# Patient Record
Sex: MALE | Race: WHITE | NOT HISPANIC OR LATINO | Employment: FULL TIME | URBAN - METROPOLITAN AREA
[De-identification: names, ages, dates, MRNs, and addresses within clinical notes are randomized per-mention and may not be internally consistent; named-entity substitution may affect disease eponyms.]

---

## 2022-03-02 ENCOUNTER — APPOINTMENT (OUTPATIENT)
Dept: CARDIOLOGY | Facility: HOSPITAL | Age: 47
End: 2022-03-02

## 2022-03-02 ENCOUNTER — HOSPITAL ENCOUNTER (OUTPATIENT)
Facility: HOSPITAL | Age: 47
Setting detail: OBSERVATION
Discharge: HOME | End: 2022-03-03
Attending: EMERGENCY MEDICINE | Admitting: FAMILY MEDICINE

## 2022-03-02 ENCOUNTER — APPOINTMENT (OUTPATIENT)
Dept: GENERAL RADIOLOGY | Facility: HOSPITAL | Age: 47
End: 2022-03-02

## 2022-03-02 DIAGNOSIS — I20.0 UNSTABLE ANGINA: Primary | ICD-10-CM

## 2022-03-02 LAB
ALBUMIN SERPL-MCNC: 4.7 G/DL (ref 3.5–5.2)
ALBUMIN/GLOB SERPL: 1.8 G/DL
ALP SERPL-CCNC: 91 U/L (ref 39–117)
ALT SERPL W P-5'-P-CCNC: 34 U/L (ref 1–41)
ANION GAP SERPL CALCULATED.3IONS-SCNC: 10.9 MMOL/L (ref 5–15)
AST SERPL-CCNC: 20 U/L (ref 1–40)
BASOPHILS # BLD AUTO: 0.06 10*3/MM3 (ref 0–0.2)
BASOPHILS NFR BLD AUTO: 0.6 % (ref 0–1.5)
BILIRUB SERPL-MCNC: 0.4 MG/DL (ref 0–1.2)
BUN SERPL-MCNC: 16 MG/DL (ref 6–20)
BUN/CREAT SERPL: 14.8 (ref 7–25)
CALCIUM SPEC-SCNC: 9.9 MG/DL (ref 8.6–10.5)
CHLORIDE SERPL-SCNC: 101 MMOL/L (ref 98–107)
CK MB SERPL-CCNC: 1.86 NG/ML
CK SERPL-CCNC: 107 U/L (ref 20–200)
CO2 SERPL-SCNC: 25.1 MMOL/L (ref 22–29)
CREAT SERPL-MCNC: 1.08 MG/DL (ref 0.76–1.27)
DEPRECATED RDW RBC AUTO: 39 FL (ref 37–54)
EGFRCR SERPLBLD CKD-EPI 2021: 85.7 ML/MIN/1.73
EOSINOPHIL # BLD AUTO: 0.18 10*3/MM3 (ref 0–0.4)
EOSINOPHIL NFR BLD AUTO: 1.9 % (ref 0.3–6.2)
ERYTHROCYTE [DISTWIDTH] IN BLOOD BY AUTOMATED COUNT: 12.4 % (ref 12.3–15.4)
GLOBULIN UR ELPH-MCNC: 2.6 GM/DL
GLUCOSE SERPL-MCNC: 99 MG/DL (ref 65–99)
HBA1C MFR BLD: 5.8 % (ref 4.8–5.6)
HCT VFR BLD AUTO: 39.1 % (ref 37.5–51)
HGB BLD-MCNC: 14.5 G/DL (ref 13–17.7)
HOLD SPECIMEN: NORMAL
IMM GRANULOCYTES # BLD AUTO: 0.03 10*3/MM3 (ref 0–0.05)
IMM GRANULOCYTES NFR BLD AUTO: 0.3 % (ref 0–0.5)
LIPASE SERPL-CCNC: 17 U/L (ref 13–60)
LYMPHOCYTES # BLD AUTO: 1.92 10*3/MM3 (ref 0.7–3.1)
LYMPHOCYTES NFR BLD AUTO: 20.6 % (ref 19.6–45.3)
MAGNESIUM SERPL-MCNC: 2.1 MG/DL (ref 1.6–2.6)
MCH RBC QN AUTO: 32.2 PG (ref 26.6–33)
MCHC RBC AUTO-ENTMCNC: 37.1 G/DL (ref 31.5–35.7)
MCV RBC AUTO: 86.7 FL (ref 79–97)
MONOCYTES # BLD AUTO: 0.64 10*3/MM3 (ref 0.1–0.9)
MONOCYTES NFR BLD AUTO: 6.9 % (ref 5–12)
NEUTROPHILS NFR BLD AUTO: 6.49 10*3/MM3 (ref 1.7–7)
NEUTROPHILS NFR BLD AUTO: 69.7 % (ref 42.7–76)
NRBC BLD AUTO-RTO: 0 /100 WBC (ref 0–0.2)
NT-PROBNP SERPL-MCNC: <5 PG/ML (ref 0–450)
PLATELET # BLD AUTO: 290 10*3/MM3 (ref 140–450)
PMV BLD AUTO: 10.2 FL (ref 6–12)
POTASSIUM SERPL-SCNC: 3.1 MMOL/L (ref 3.5–5.2)
PROT SERPL-MCNC: 7.3 G/DL (ref 6–8.5)
RBC # BLD AUTO: 4.51 10*6/MM3 (ref 4.14–5.8)
RBC MORPH BLD: NORMAL
SMALL PLATELETS BLD QL SMEAR: ADEQUATE
SODIUM SERPL-SCNC: 137 MMOL/L (ref 136–145)
TROPONIN I SERPL-MCNC: 0 NG/ML (ref 0–0.6)
TROPONIN I SERPL-MCNC: 0 NG/ML (ref 0–0.6)
WBC MORPH BLD: NORMAL
WBC NRBC COR # BLD: 9.32 10*3/MM3 (ref 3.4–10.8)
WHOLE BLOOD HOLD SPECIMEN: NORMAL
WHOLE BLOOD HOLD SPECIMEN: NORMAL

## 2022-03-02 PROCEDURE — 85025 COMPLETE CBC W/AUTO DIFF WBC: CPT

## 2022-03-02 PROCEDURE — 83880 ASSAY OF NATRIURETIC PEPTIDE: CPT

## 2022-03-02 PROCEDURE — 82553 CREATINE MB FRACTION: CPT

## 2022-03-02 PROCEDURE — 82550 ASSAY OF CK (CPK): CPT

## 2022-03-02 PROCEDURE — 93005 ELECTROCARDIOGRAM TRACING: CPT | Performed by: INTERNAL MEDICINE

## 2022-03-02 PROCEDURE — 71045 X-RAY EXAM CHEST 1 VIEW: CPT

## 2022-03-02 PROCEDURE — 93005 ELECTROCARDIOGRAM TRACING: CPT | Performed by: EMERGENCY MEDICINE

## 2022-03-02 PROCEDURE — 93010 ELECTROCARDIOGRAM REPORT: CPT | Performed by: SPECIALIST

## 2022-03-02 PROCEDURE — G0378 HOSPITAL OBSERVATION PER HR: HCPCS

## 2022-03-02 PROCEDURE — 83690 ASSAY OF LIPASE: CPT

## 2022-03-02 PROCEDURE — 85007 BL SMEAR W/DIFF WBC COUNT: CPT

## 2022-03-02 PROCEDURE — 84484 ASSAY OF TROPONIN QUANT: CPT

## 2022-03-02 PROCEDURE — 99285 EMERGENCY DEPT VISIT HI MDM: CPT

## 2022-03-02 PROCEDURE — 83036 HEMOGLOBIN GLYCOSYLATED A1C: CPT | Performed by: INTERNAL MEDICINE

## 2022-03-02 PROCEDURE — 83735 ASSAY OF MAGNESIUM: CPT

## 2022-03-02 PROCEDURE — 99220 PR INITIAL OBSERVATION CARE/DAY 70 MINUTES: CPT | Performed by: INTERNAL MEDICINE

## 2022-03-02 PROCEDURE — 80053 COMPREHEN METABOLIC PANEL: CPT

## 2022-03-02 PROCEDURE — 94799 UNLISTED PULMONARY SVC/PX: CPT

## 2022-03-02 PROCEDURE — 93010 ELECTROCARDIOGRAM REPORT: CPT | Performed by: INTERNAL MEDICINE

## 2022-03-02 PROCEDURE — 93005 ELECTROCARDIOGRAM TRACING: CPT

## 2022-03-02 RX ORDER — BUPROPION HYDROCHLORIDE 150 MG/1
150 TABLET ORAL DAILY
Status: DISCONTINUED | OUTPATIENT
Start: 2022-03-03 | End: 2022-03-03 | Stop reason: HOSPADM

## 2022-03-02 RX ORDER — ONDANSETRON 2 MG/ML
4 INJECTION INTRAMUSCULAR; INTRAVENOUS EVERY 6 HOURS PRN
Status: DISCONTINUED | OUTPATIENT
Start: 2022-03-02 | End: 2022-03-03 | Stop reason: HOSPADM

## 2022-03-02 RX ORDER — ASPIRIN 81 MG/1
324 TABLET, CHEWABLE ORAL ONCE
Status: COMPLETED | OUTPATIENT
Start: 2022-03-02 | End: 2022-03-02

## 2022-03-02 RX ORDER — ALUMINA, MAGNESIA, AND SIMETHICONE 2400; 2400; 240 MG/30ML; MG/30ML; MG/30ML
15 SUSPENSION ORAL EVERY 6 HOURS PRN
Status: DISCONTINUED | OUTPATIENT
Start: 2022-03-02 | End: 2022-03-03 | Stop reason: HOSPADM

## 2022-03-02 RX ORDER — MIRTAZAPINE 15 MG/1
15 TABLET, FILM COATED ORAL NIGHTLY
COMMUNITY

## 2022-03-02 RX ORDER — ATORVASTATIN CALCIUM 40 MG/1
80 TABLET, FILM COATED ORAL NIGHTLY
Status: DISCONTINUED | OUTPATIENT
Start: 2022-03-02 | End: 2022-03-03 | Stop reason: HOSPADM

## 2022-03-02 RX ORDER — POLYETHYLENE GLYCOL 3350 17 G/17G
17 POWDER, FOR SOLUTION ORAL DAILY PRN
Status: DISCONTINUED | OUTPATIENT
Start: 2022-03-02 | End: 2022-03-03 | Stop reason: HOSPADM

## 2022-03-02 RX ORDER — ACETAMINOPHEN 325 MG/1
650 TABLET ORAL EVERY 4 HOURS PRN
Status: DISCONTINUED | OUTPATIENT
Start: 2022-03-02 | End: 2022-03-03 | Stop reason: HOSPADM

## 2022-03-02 RX ORDER — MULTIPLE VITAMINS W/ MINERALS TAB 9MG-400MCG
1 TAB ORAL DAILY
Status: DISCONTINUED | OUTPATIENT
Start: 2022-03-03 | End: 2022-03-03 | Stop reason: HOSPADM

## 2022-03-02 RX ORDER — NITROGLYCERIN 0.4 MG/1
0.4 TABLET SUBLINGUAL
Status: DISCONTINUED | OUTPATIENT
Start: 2022-03-02 | End: 2022-03-03 | Stop reason: HOSPADM

## 2022-03-02 RX ORDER — AMOXICILLIN 250 MG
2 CAPSULE ORAL 2 TIMES DAILY
Status: DISCONTINUED | OUTPATIENT
Start: 2022-03-02 | End: 2022-03-03 | Stop reason: HOSPADM

## 2022-03-02 RX ORDER — MORPHINE SULFATE 2 MG/ML
1 INJECTION, SOLUTION INTRAMUSCULAR; INTRAVENOUS EVERY 4 HOURS PRN
Status: DISCONTINUED | OUTPATIENT
Start: 2022-03-02 | End: 2022-03-03 | Stop reason: HOSPADM

## 2022-03-02 RX ORDER — SODIUM CHLORIDE 0.9 % (FLUSH) 0.9 %
10 SYRINGE (ML) INJECTION AS NEEDED
Status: DISCONTINUED | OUTPATIENT
Start: 2022-03-02 | End: 2022-03-03 | Stop reason: HOSPADM

## 2022-03-02 RX ORDER — MULTIPLE VITAMINS W/ MINERALS TAB 9MG-400MCG
1 TAB ORAL DAILY
COMMUNITY

## 2022-03-02 RX ORDER — BUPROPION HYDROCHLORIDE 150 MG/1
150 TABLET ORAL NIGHTLY
COMMUNITY

## 2022-03-02 RX ORDER — BISACODYL 5 MG/1
5 TABLET, DELAYED RELEASE ORAL DAILY PRN
Status: DISCONTINUED | OUTPATIENT
Start: 2022-03-02 | End: 2022-03-03 | Stop reason: HOSPADM

## 2022-03-02 RX ORDER — NALOXONE HCL 0.4 MG/ML
0.4 VIAL (ML) INJECTION
Status: DISCONTINUED | OUTPATIENT
Start: 2022-03-02 | End: 2022-03-03 | Stop reason: HOSPADM

## 2022-03-02 RX ORDER — POTASSIUM CHLORIDE 750 MG/1
40 CAPSULE, EXTENDED RELEASE ORAL EVERY 4 HOURS
Status: COMPLETED | OUTPATIENT
Start: 2022-03-02 | End: 2022-03-03

## 2022-03-02 RX ORDER — HYDROCHLOROTHIAZIDE 25 MG/1
25 TABLET ORAL NIGHTLY
COMMUNITY
End: 2022-03-03 | Stop reason: HOSPADM

## 2022-03-02 RX ORDER — MIRTAZAPINE 15 MG/1
15 TABLET, FILM COATED ORAL NIGHTLY
Status: DISCONTINUED | OUTPATIENT
Start: 2022-03-03 | End: 2022-03-03 | Stop reason: HOSPADM

## 2022-03-02 RX ORDER — ASPIRIN 81 MG/1
81 TABLET ORAL DAILY
Status: DISCONTINUED | OUTPATIENT
Start: 2022-03-03 | End: 2022-03-03 | Stop reason: HOSPADM

## 2022-03-02 RX ORDER — BISACODYL 10 MG
10 SUPPOSITORY, RECTAL RECTAL DAILY PRN
Status: DISCONTINUED | OUTPATIENT
Start: 2022-03-02 | End: 2022-03-03 | Stop reason: HOSPADM

## 2022-03-02 RX ADMIN — METOPROLOL TARTRATE 12.5 MG: 25 TABLET, FILM COATED ORAL at 21:00

## 2022-03-02 RX ADMIN — POTASSIUM CHLORIDE 40 MEQ: 750 CAPSULE, EXTENDED RELEASE ORAL at 20:20

## 2022-03-02 RX ADMIN — ASPIRIN 81 MG CHEWABLE TABLET 324 MG: 81 TABLET CHEWABLE at 14:24

## 2022-03-02 NOTE — ED PROVIDER NOTES
"Subjective   Patient presents to the emergency department reporting an episode of severe chest pain at work today.  He scribes the pain as a tightness and pressure.  He also states that it felt like a heavy weight was sitting on his chest.  He says that his face went flushing he felt like he was \"going down\".  He reports that he quit smoking 2 weeks ago.  He says for the past year he has been having headaches off and on and now it is in his chest.  He does report a history of anxiety but says this is different from anything he is ever experienced.        History provided by:  Patient   used: No        Review of Systems   Constitutional: Negative for chills and fever.   HENT: Negative for congestion, ear pain, rhinorrhea and sore throat.    Eyes: Negative for pain.   Respiratory: Negative for cough and shortness of breath.    Cardiovascular: Positive for chest pain (Weight, pressure, tightness).   Gastrointestinal: Negative for abdominal pain, diarrhea, nausea and vomiting.   Genitourinary: Negative for decreased urine volume, dysuria and flank pain.   Musculoskeletal: Negative for arthralgias and myalgias.   Skin: Positive for color change (Face flush). Negative for rash.   Neurological: Positive for light-headedness. Negative for seizures and headaches.   All other systems reviewed and are negative.      History reviewed. No pertinent past medical history.    No Known Allergies    History reviewed. No pertinent surgical history.    History reviewed. No pertinent family history.    Social History     Socioeconomic History   • Marital status:    Tobacco Use   • Smoking status: Former Smoker     Quit date: 2022     Years since quittin.0           Objective   Physical Exam  Vitals and nursing note reviewed.   Constitutional:       General: He is not in acute distress.     Appearance: Normal appearance. He is normal weight. He is not ill-appearing, toxic-appearing or diaphoretic. "   HENT:      Head: Normocephalic and atraumatic.      Right Ear: External ear normal.      Left Ear: External ear normal.   Eyes:      General: No scleral icterus.     Conjunctiva/sclera: Conjunctivae normal.      Pupils: Pupils are equal, round, and reactive to light.   Cardiovascular:      Rate and Rhythm: Normal rate and regular rhythm.      Heart sounds: Normal heart sounds.   Pulmonary:      Effort: Pulmonary effort is normal. No respiratory distress.      Breath sounds: Normal breath sounds.   Abdominal:      General: Abdomen is flat. Bowel sounds are normal. There is no distension.      Palpations: Abdomen is soft.      Tenderness: There is no abdominal tenderness.   Musculoskeletal:         General: No swelling, tenderness, deformity or signs of injury. Normal range of motion.      Cervical back: Normal range of motion and neck supple.   Skin:     General: Skin is warm and dry.      Capillary Refill: Capillary refill takes less than 2 seconds.   Neurological:      General: No focal deficit present.      Mental Status: He is alert and oriented to person, place, and time.   Psychiatric:         Mood and Affect: Mood normal.         Behavior: Behavior normal.         Procedures           ED Course                                               HEART Score (for prediction of 6-week risk of major adverse cardiac event) reviewed and/or performed as part of the patient evaluation and treatment planning process.  The result associated with this review/performance is: 4       MDM  Number of Diagnoses or Management Options  Unstable angina (HCC): new and requires workup     Amount and/or Complexity of Data Reviewed  Clinical lab tests: reviewed  Tests in the radiology section of CPT®: reviewed  Tests in the medicine section of CPT®: reviewed  Discuss the patient with other providers: yes (Dr. Topher Stone  )    Risk of Complications, Morbidity, and/or Mortality  Presenting problems:  moderate  Diagnostic procedures: moderate  Management options: moderate    Patient Progress  Patient progress: stable      Final diagnoses:   Unstable angina (HCC)       ED Disposition  ED Disposition     ED Disposition   Decision to Admit    Condition   --    Comment   Level of Care: Telemetry [5]   Diagnosis: Unstable angina (HCC) [040162]   Admitting Physician: AMPARO SAENZ [[id number]]   Attending Physician: AMPARO SAENZ [[id number]]               Reinier Figueroa, APRN  [address] [facility] [address]  [place] [address]  [phone]      Hospital discharge follow-up transitional care 1 to 2 weeks Chest pain myocardial infarction ruled out, prediabetes, hyperlipidemia, hypertension hypokalemia    Reinier Figueroa, APRN  [address] [facility] [address]  [place] [id number] [phone]               Medication List      New Prescriptions    aspirin 81 MG EC tablet  Take 1 tablet by mouth Daily for 30 days.     atorvastatin 20 MG tablet  Commonly known as: LIPITOR  Take 1 tablet by mouth Every Night for 30 days.     losartan 25 MG tablet  Commonly known as: Cozaar  Take 1 tablet by mouth Daily for 30 days.        Stop    hydroCHLOROthiazide 25 MG tablet  Commonly known as: HYDRODIURIL           Where to Get Your Medications      These medications were sent to [facility] - [place], [place] - [address] - [phone]  - [phone]   [address], [place] [address]    Phone: [phone]   · aspirin 81 MG EC tablet  · atorvastatin 20 MG tablet  · losartan 25 MG tablet          Deborah Phelan, ANNY  03/05/22 6148

## 2022-03-03 ENCOUNTER — APPOINTMENT (OUTPATIENT)
Dept: NUCLEAR MEDICINE | Facility: HOSPITAL | Age: 47
End: 2022-03-03

## 2022-03-03 ENCOUNTER — APPOINTMENT (OUTPATIENT)
Dept: CARDIOLOGY | Facility: HOSPITAL | Age: 47
End: 2022-03-03

## 2022-03-03 ENCOUNTER — TELEPHONE (OUTPATIENT)
Dept: CARDIOLOGY | Facility: CLINIC | Age: 47
End: 2022-03-03

## 2022-03-03 VITALS
BODY MASS INDEX: 31.14 KG/M2 | HEIGHT: 67 IN | DIASTOLIC BLOOD PRESSURE: 76 MMHG | OXYGEN SATURATION: 97 % | TEMPERATURE: 97.7 F | WEIGHT: 198.41 LBS | HEART RATE: 61 BPM | SYSTOLIC BLOOD PRESSURE: 115 MMHG | RESPIRATION RATE: 20 BRPM

## 2022-03-03 PROBLEM — E78.5 HYPERLIPIDEMIA: Status: ACTIVE | Noted: 2022-03-03

## 2022-03-03 PROBLEM — I20.0 UNSTABLE ANGINA: Status: RESOLVED | Noted: 2022-03-02 | Resolved: 2022-03-03

## 2022-03-03 PROBLEM — R07.9 CHEST PAIN: Status: ACTIVE | Noted: 2022-03-03

## 2022-03-03 PROBLEM — Z72.0 TOBACCO ABUSE: Status: ACTIVE | Noted: 2022-03-03

## 2022-03-03 PROBLEM — R73.03 PRE-DIABETES: Status: ACTIVE | Noted: 2022-03-03

## 2022-03-03 LAB
ANION GAP SERPL CALCULATED.3IONS-SCNC: 9.4 MMOL/L (ref 5–15)
BH CV ECHO MEAS - AO ROOT DIAM: 3.2 CM
BH CV ECHO MEAS - EDV(MOD-SP2): 75 ML
BH CV ECHO MEAS - EDV(MOD-SP4): 97 ML
BH CV ECHO MEAS - EF(MOD-BP): 47 %
BH CV ECHO MEAS - ESV(MOD-SP2): 41 ML
BH CV ECHO MEAS - ESV(MOD-SP4): 51 ML
BH CV ECHO MEAS - IVSD: 1.1 CM
BH CV ECHO MEAS - LA DIMENSION(2D): 3.2 CM
BH CV ECHO MEAS - LAT PEAK E' VEL: 10.7 CM/SEC
BH CV ECHO MEAS - LVIDD: 4.7 CM
BH CV ECHO MEAS - LVIDS: 2.8 CM
BH CV ECHO MEAS - LVOT DIAM: 2 CM
BH CV ECHO MEAS - LVPWD: 0.9 CM
BH CV ECHO MEAS - MED PEAK E' VEL: 7.83 CM/SEC
BH CV ECHO MEAS - MV A MAX VEL: 58 CM/SEC
BH CV ECHO MEAS - MV DEC TIME: 170 MSEC
BH CV ECHO MEAS - MV E MAX VEL: 94 CM/SEC
BH CV ECHO MEAS - MV E/A: 1.6
BH CV ECHO MEAS - MV MAX PG: 4 MMHG
BH CV ECHO MEAS - MV MEAN PG: 1 MMHG
BH CV ECHO MEAS - MV P1/2T: 72 MSEC
BH CV ECHO MEAS - MV V2 VTI: 32 CM
BH CV ECHO MEAS - MVA(P1/2T): 3.1 CM2
BH CV ECHO MEAS - RVDD: 2.7 CM
BH CV ECHO MEASUREMENTS AVERAGE E/E' RATIO: 10.15
BH CV IMMEDIATE POST RECOVERY TECH DATA SYMPTOMS: NORMAL
BH CV IMMEDIATE POST TECH DATA BLOOD PRESSURE: NORMAL MMHG
BH CV IMMEDIATE POST TECH DATA HEART RATE: 150 BPM
BH CV IMMEDIATE POST TECH DATA OXYGEN SATS: 94 %
BH CV REST NUCLEAR ISOTOPE DOSE: 9.7 MCI
BH CV SIX MINUTE RECOVERY TECH DATA BLOOD PRESSURE: NORMAL
BH CV SIX MINUTE RECOVERY TECH DATA HEART RATE: 98 BPM
BH CV SIX MINUTE RECOVERY TECH DATA OXYGEN SATURATION: 96 %
BH CV STRESS BP STAGE 1: NORMAL
BH CV STRESS BP STAGE 2: NORMAL
BH CV STRESS BP STAGE 3: NORMAL
BH CV STRESS BP STAGE 4: NORMAL
BH CV STRESS DURATION MIN STAGE 1: 3
BH CV STRESS DURATION MIN STAGE 2: 3
BH CV STRESS DURATION MIN STAGE 3: 3
BH CV STRESS DURATION SEC STAGE 1: 0
BH CV STRESS DURATION SEC STAGE 2: 0
BH CV STRESS DURATION SEC STAGE 3: 0
BH CV STRESS DURATION SEC STAGE 4: 30
BH CV STRESS GRADE STAGE 1: 10
BH CV STRESS GRADE STAGE 2: 12
BH CV STRESS GRADE STAGE 3: 14
BH CV STRESS GRADE STAGE 4: 16
BH CV STRESS HR STAGE 1: 100
BH CV STRESS HR STAGE 2: 120
BH CV STRESS HR STAGE 3: 145
BH CV STRESS HR STAGE 4: 150
BH CV STRESS METS STAGE 1: 5
BH CV STRESS METS STAGE 2: 7.5
BH CV STRESS METS STAGE 3: 10
BH CV STRESS METS STAGE 4: 13.5
BH CV STRESS NUCLEAR ISOTOPE DOSE: 35.6 MCI
BH CV STRESS O2 STAGE 1: 95
BH CV STRESS O2 STAGE 2: 96
BH CV STRESS O2 STAGE 3: 94
BH CV STRESS O2 STAGE 4: 94
BH CV STRESS PROTOCOL 1: NORMAL
BH CV STRESS RECOVERY BP: NORMAL MMHG
BH CV STRESS RECOVERY HR: 98 BPM
BH CV STRESS RECOVERY O2: 97 %
BH CV STRESS SPEED STAGE 1: 1.7
BH CV STRESS SPEED STAGE 2: 2.5
BH CV STRESS SPEED STAGE 3: 3.4
BH CV STRESS SPEED STAGE 4: 4.2
BH CV STRESS STAGE 1: 1
BH CV STRESS STAGE 2: 2
BH CV STRESS STAGE 3: 3
BH CV STRESS STAGE 4: 4
BH CV THREE MINUTE POST TECH DATA BLOOD PRESSURE: NORMAL MMHG
BH CV THREE MINUTE POST TECH DATA HEART RATE: 105 BPM
BH CV THREE MINUTE POST TECH DATA OXYGEN SATURATION: 97 %
BH CV THREE MINUTE RECOVERY TECH DATA SYMPTOM: NORMAL
BUN SERPL-MCNC: 18 MG/DL (ref 6–20)
BUN/CREAT SERPL: 17.3 (ref 7–25)
CALCIUM SPEC-SCNC: 9.6 MG/DL (ref 8.6–10.5)
CHLORIDE SERPL-SCNC: 106 MMOL/L (ref 98–107)
CHOLEST SERPL-MCNC: 211 MG/DL (ref 0–200)
CO2 SERPL-SCNC: 26.6 MMOL/L (ref 22–29)
CREAT SERPL-MCNC: 1.04 MG/DL (ref 0.76–1.27)
DEPRECATED RDW RBC AUTO: 40.6 FL (ref 37–54)
EGFRCR SERPLBLD CKD-EPI 2021: 89.7 ML/MIN/1.73
ERYTHROCYTE [DISTWIDTH] IN BLOOD BY AUTOMATED COUNT: 12.6 % (ref 12.3–15.4)
GLUCOSE SERPL-MCNC: 112 MG/DL (ref 65–99)
HCT VFR BLD AUTO: 39.8 % (ref 37.5–51)
HDLC SERPL-MCNC: 33 MG/DL (ref 40–60)
HGB BLD-MCNC: 14.3 G/DL (ref 13–17.7)
INR PPP: 1.05 (ref 2–3)
IVRT: 98 MSEC
LDLC SERPL CALC-MCNC: 110 MG/DL (ref 0–100)
LDLC/HDLC SERPL: 3.02 {RATIO}
LEFT ATRIUM VOLUME INDEX: 23.4 ML/M2
LV EF NUC BP: 60 %
MAGNESIUM SERPL-MCNC: 2.4 MG/DL (ref 1.6–2.6)
MAXIMAL PREDICTED HEART RATE: 174 BPM
MAXIMAL PREDICTED HEART RATE: 174 BPM
MCH RBC QN AUTO: 31.5 PG (ref 26.6–33)
MCHC RBC AUTO-ENTMCNC: 35.9 G/DL (ref 31.5–35.7)
MCV RBC AUTO: 87.7 FL (ref 79–97)
PERCENT MAX PREDICTED HR: 86.21 %
PHOSPHATE SERPL-MCNC: 2.6 MG/DL (ref 2.5–4.5)
PLATELET # BLD AUTO: 282 10*3/MM3 (ref 140–450)
PMV BLD AUTO: 10 FL (ref 6–12)
POTASSIUM SERPL-SCNC: 3.9 MMOL/L (ref 3.5–5.2)
PROTHROMBIN TIME: 10.9 SECONDS (ref 9.4–12)
QT INTERVAL: 375 MS
QT INTERVAL: 396 MS
QT INTERVAL: 399 MS
RBC # BLD AUTO: 4.54 10*6/MM3 (ref 4.14–5.8)
SODIUM SERPL-SCNC: 142 MMOL/L (ref 136–145)
STRESS BASELINE BP: NORMAL MMHG
STRESS BASELINE HR: 79 BPM
STRESS O2 SAT REST: 97 %
STRESS PERCENT HR: 101 %
STRESS POST ESTIMATED WORKLOAD: 10.7 METS
STRESS POST EXERCISE DUR MIN: 9 MIN
STRESS POST EXERCISE DUR SEC: 35 SEC
STRESS POST O2 SAT PEAK: 98 %
STRESS POST PEAK BP: NORMAL MMHG
STRESS POST PEAK HR: 150 BPM
STRESS TARGET HR: 148 BPM
STRESS TARGET HR: 148 BPM
TRIGL SERPL-MCNC: 391 MG/DL (ref 0–150)
TROPONIN T SERPL-MCNC: <0.01 NG/ML (ref 0–0.03)
VLDLC SERPL-MCNC: 68 MG/DL (ref 5–40)
WBC NRBC COR # BLD: 8.97 10*3/MM3 (ref 3.4–10.8)

## 2022-03-03 PROCEDURE — 96372 THER/PROPH/DIAG INJ SC/IM: CPT

## 2022-03-03 PROCEDURE — 93016 CV STRESS TEST SUPVJ ONLY: CPT | Performed by: NURSE PRACTITIONER

## 2022-03-03 PROCEDURE — 83735 ASSAY OF MAGNESIUM: CPT | Performed by: INTERNAL MEDICINE

## 2022-03-03 PROCEDURE — 85610 PROTHROMBIN TIME: CPT | Performed by: INTERNAL MEDICINE

## 2022-03-03 PROCEDURE — 85027 COMPLETE CBC AUTOMATED: CPT | Performed by: INTERNAL MEDICINE

## 2022-03-03 PROCEDURE — 93306 TTE W/DOPPLER COMPLETE: CPT | Performed by: INTERNAL MEDICINE

## 2022-03-03 PROCEDURE — A9502 TC99M TETROFOSMIN: HCPCS | Performed by: FAMILY MEDICINE

## 2022-03-03 PROCEDURE — 25010000002 SULFUR HEXAFLUORIDE MICROSPH 60.7-25 MG RECONSTITUTED SUSPENSION: Performed by: FAMILY MEDICINE

## 2022-03-03 PROCEDURE — 93017 CV STRESS TEST TRACING ONLY: CPT

## 2022-03-03 PROCEDURE — G0378 HOSPITAL OBSERVATION PER HR: HCPCS

## 2022-03-03 PROCEDURE — 84484 ASSAY OF TROPONIN QUANT: CPT | Performed by: INTERNAL MEDICINE

## 2022-03-03 PROCEDURE — 25010000002 ENOXAPARIN PER 10 MG: Performed by: INTERNAL MEDICINE

## 2022-03-03 PROCEDURE — 99203 OFFICE O/P NEW LOW 30 MIN: CPT | Performed by: SPECIALIST

## 2022-03-03 PROCEDURE — 93306 TTE W/DOPPLER COMPLETE: CPT

## 2022-03-03 PROCEDURE — 78452 HT MUSCLE IMAGE SPECT MULT: CPT

## 2022-03-03 PROCEDURE — 80048 BASIC METABOLIC PNL TOTAL CA: CPT | Performed by: INTERNAL MEDICINE

## 2022-03-03 PROCEDURE — 78452 HT MUSCLE IMAGE SPECT MULT: CPT | Performed by: SPECIALIST

## 2022-03-03 PROCEDURE — 84100 ASSAY OF PHOSPHORUS: CPT | Performed by: INTERNAL MEDICINE

## 2022-03-03 PROCEDURE — 93018 CV STRESS TEST I&R ONLY: CPT | Performed by: SPECIALIST

## 2022-03-03 PROCEDURE — 94799 UNLISTED PULMONARY SVC/PX: CPT

## 2022-03-03 PROCEDURE — 0 TECHNETIUM TETROFOSMIN KIT: Performed by: FAMILY MEDICINE

## 2022-03-03 PROCEDURE — 36415 COLL VENOUS BLD VENIPUNCTURE: CPT | Performed by: INTERNAL MEDICINE

## 2022-03-03 PROCEDURE — 99217 PR OBSERVATION CARE DISCHARGE MANAGEMENT: CPT | Performed by: FAMILY MEDICINE

## 2022-03-03 PROCEDURE — 80061 LIPID PANEL: CPT | Performed by: INTERNAL MEDICINE

## 2022-03-03 RX ORDER — NAPROXEN 500 MG/1
500 TABLET ORAL 2 TIMES DAILY WITH MEALS
COMMUNITY

## 2022-03-03 RX ORDER — CHOLECALCIFEROL (VITAMIN D3) 125 MCG
5 CAPSULE ORAL NIGHTLY PRN
COMMUNITY

## 2022-03-03 RX ORDER — LOSARTAN POTASSIUM 25 MG/1
25 TABLET ORAL DAILY
Qty: 30 TABLET | Refills: 0 | Status: SHIPPED | OUTPATIENT
Start: 2022-03-03 | End: 2022-04-02

## 2022-03-03 RX ORDER — ATORVASTATIN CALCIUM 20 MG/1
20 TABLET, FILM COATED ORAL NIGHTLY
Qty: 30 TABLET | Refills: 0 | Status: SHIPPED | OUTPATIENT
Start: 2022-03-03 | End: 2022-04-02

## 2022-03-03 RX ORDER — ASPIRIN 81 MG/1
81 TABLET ORAL DAILY
Qty: 30 TABLET | Refills: 0 | Status: SHIPPED | OUTPATIENT
Start: 2022-03-03 | End: 2022-04-02

## 2022-03-03 RX ORDER — MECLIZINE HCL 25MG 25 MG/1
25 TABLET, CHEWABLE ORAL EVERY 6 HOURS PRN
COMMUNITY

## 2022-03-03 RX ADMIN — ENOXAPARIN SODIUM 40 MG: 100 INJECTION SUBCUTANEOUS at 00:03

## 2022-03-03 RX ADMIN — SENNOSIDES AND DOCUSATE SODIUM 2 TABLET: 50; 8.6 TABLET ORAL at 08:44

## 2022-03-03 RX ADMIN — MULTIPLE VITAMINS W/ MINERALS TAB 1 TABLET: TAB at 08:45

## 2022-03-03 RX ADMIN — Medication 10 ML: at 08:45

## 2022-03-03 RX ADMIN — POTASSIUM CHLORIDE 40 MEQ: 750 CAPSULE, EXTENDED RELEASE ORAL at 00:02

## 2022-03-03 RX ADMIN — SULFUR HEXAFLUORIDE 2 ML: KIT at 08:53

## 2022-03-03 RX ADMIN — TETROFOSMIN 1 DOSE: 1.38 INJECTION, POWDER, LYOPHILIZED, FOR SOLUTION INTRAVENOUS at 09:45

## 2022-03-03 RX ADMIN — TETROFOSMIN 1 DOSE: 1.38 INJECTION, POWDER, LYOPHILIZED, FOR SOLUTION INTRAVENOUS at 11:10

## 2022-03-03 RX ADMIN — ASPIRIN 81 MG: 81 TABLET, COATED ORAL at 08:45

## 2022-03-03 RX ADMIN — ATORVASTATIN CALCIUM 80 MG: 40 TABLET, FILM COATED ORAL at 00:02

## 2022-03-03 RX ADMIN — METOPROLOL TARTRATE 12.5 MG: 25 TABLET, FILM COATED ORAL at 12:15

## 2022-03-03 RX ADMIN — BUPROPION HYDROCHLORIDE 150 MG: 150 TABLET, EXTENDED RELEASE ORAL at 08:44

## 2022-03-03 RX ADMIN — MIRTAZAPINE 15 MG: 15 TABLET, FILM COATED ORAL at 00:02

## 2022-03-03 NOTE — TELEPHONE ENCOUNTER
Discharge nurse from [facility] called to make follow up. Patient's insurance requires an authorization to been seen in office , D/C nurse to inform pt needs to obtain authorization before appointment is made

## 2022-03-03 NOTE — SIGNIFICANT NOTE
03/03/22 1550   Plan   Final Discharge Disposition Code 01 - home or self-care   Final Note Pt discharging home with self care. Pt denies any dc needs. Dc medications sent to pharmacy. Verified demographics and pharmacy.

## 2022-03-03 NOTE — DISCHARGE SUMMARY
[facility]         HOSPITALIST  DISCHARGE SUMMARY    Patient Name: Ramses Mcclelland  : 1975  MRN: [id number]    Date of Admission: 3/2/2022  Date of Discharge: 3/3/2022  Primary Care Physician: Reinier Figueroa APRN    Consults     Date and Time Order Name Status Description    3/2/2022  7:12 PM Cardiology (on-call MD unless specified)      3/2/2022  6:45 PM Hospitalist (on-call MD unless specified)            Active and Resolved Hospital Problems:  Active Hospital Problems    Diagnosis POA   • Chest pain [R07.9] Yes   • Pre-diabetes [R73.03] Yes   • Hyperlipidemia [E78.5] Yes   • Tobacco abuse [Z72.0] No      Resolved Hospital Problems    Diagnosis POA   • Unstable angina (HCC) [I20.0] Yes       Hospital Course     Hospital Course:  Ramses Mcclelland is a 46 y.o. male who presents with intermittent chest pain over the past 2 weeks.  Patient has a pmhx notable for PPD smoking and HTN.  Patient states he has had episodes of central chest pain that are unprovoked, last about 2-3 minutes, resolve without any particular intervention and are described as a tightness.  They dont seem worsened by activity and can happen at rest.  He notes they were fairly mild initially and happened once or twice a day intermittently. Symptoms have gradually worsened over that time.  He has not tried any new medications for this.  Denies gerd symptoms.  Today at work he had an episode that was sever, central chest heaviness/weight/pressure like, he became flushed, had arm and hand numbness and tingling.  He states the episode made him the most terrified he has ever been and this prompted him to seek ED evaluation.  He was given 2x NG by EMS and notes this was the first thing that started to relieve his symptoms.  Currently he has only mild residual symptoms.    Of note patient states he quit smoking about 2 weeks ago when he started having chest pain.  Patient mated for further evaluation and treatment.   Cardiology consulted.  Serial cardiac enzymes within normal limits.  No ischemic changes on EKG.  No events on telemetry.  Patient went for chemical stress test which was within normal limits indicative of a low risk test.  Discussed with cardiology.  Patient continued to have some tenderness to palpation under the left breast no further issues with numbness and tingling or flushing.  Hemoglobin A1c found to be mildly elevated at 5.8.  Cholesterol triglycerides and LDL found to be elevated.  Discussed findings with patient and wife who are wishing to decrease patient's risk for cardiac events.  Encourage patient to continue not to smoke patient started on aspirin atorvastatin and losartan.  Hydrochlorothiazide stopped secondary to hypokalemia.  Serum potassium replaced.  Patient seen and evaluated on day of discharge and thought stable for discharge home with the following instructions:    Chest pain acute myocardial infarction ruled out  -Follow-up with Dr. Mayberry cardiology in 2 to 3 weeks  -Follow-up your primary care provider in 1 to 2 weeks  -Start aspirin 81 mg daily, atorvastatin 20 mg daily and losartan 25 mg daily for primary prevention of cardiac event in the next 10 years  -Continue not to smoke  -Start heart healthy carb consistent diet with exercise 30 minutes a day 5 days a week for goal weight loss of 1 to 2 pounds per week goal BMI of less than 26    Hypokalemia  -Stop home hydrochlorothiazide  -Start losartan as above    Hypertension  -Start losartan as above  -Stop home hydrochlorothiazide secondary to hypokalemia    Anxiety/depression  -Continue home mirtazapine and bupropion as prescribed by your primary care provider    Prediabetes A1c of 5.8  -Follow-up with primary care provider as above  -Start heart healthy carb consistent diet with 30 minutes of exercise 5 days a week goal BMI of less than 26        Please follow-up with providers as stated above.  Please take all medications per  discharge medication list.  Please seek immediate medical attention for increased chest pain, shortness of breath, nausea, vomiting, abdominal pain, change in color stools especially dark tarry stools.      DISCHARGE Follow Up Recommendations for labs and diagnostics: As above      Day of Discharge     Vital Signs:  Temp:  [97.3 °F (36.3 °C)-97.7 °F (36.5 °C)] 97.7 °F (36.5 °C)  Heart Rate:  [63-95] 70  Resp:  [17-27] 20  BP: (106-158)/() 123/77  Physical Exam:   Gen. well-developed appearing stated age in no acute distress  HEENT: Normocephalic atraumatic moist membranes pupils equal round reactive light, no scleral icterus no conjunctival injection  Cardiovascular: regular rate and rhythm no murmurs rubs or gallops S1-S2, no lower extremity edema appreciated  Pulmonary: Clear to auscultation bilaterally no wheezes rales or rhonchi symmetric chest expansion, unlabored, no conversational dyspnea appreciated  Gastrointestinal: Soft nontender nondistended positive bowel sounds all 4 quadrants no rebound or guarding  Musculoskeletal: No clubbing cyanosis, warm and well-perfused, calves soft symmetric nontender bilaterally  Skin: Clean dry without rashs  Neuro: Cranial nerves II through XII intact grossly no sensorimotor deficits appreciated bilateral upper and lower extremities  Psych: Patient is calm cooperative and appropriate with exam not responding to internal stimuli  : No [place] catheter no bladder distention no suprapubic tenderness      Discharge Details        Discharge Medications      New Medications      Instructions Start Date   aspirin 81 MG EC tablet   81 mg, Oral, Daily      atorvastatin 20 MG tablet  Commonly known as: LIPITOR   20 mg, Oral, Nightly      losartan 25 MG tablet  Commonly known as: Cozaar   25 mg, Oral, Daily         Continue These Medications      Instructions Start Date   buPROPion  MG 24 hr tablet  Commonly known as: WELLBUTRIN XL   150 mg, Oral, Nightly      meclizine 25  MG chewable tablet chewable tablet   25 mg, Oral, Every 6 Hours PRN      melatonin 5 MG tablet tablet   5 mg, Oral, Nightly PRN      mirtazapine 15 MG tablet  Commonly known as: REMERON   15 mg, Oral, Nightly      multivitamin with minerals tablet tablet   1 tablet, Oral, Daily      naproxen 500 MG tablet  Commonly known as: NAPROSYN   500 mg, Oral, 2 Times Daily With Meals         Stop These Medications    hydroCHLOROthiazide 25 MG tablet  Commonly known as: HYDRODIURIL            No Known Allergies    Discharge Disposition:  Home or Self Care    Diet:  Hospital:  Diet Order   Procedures   • Diet Regular; Cardiac       Discharge Activity:   Activity Instructions     Gradually Increase Activity Until at Pre-Hospitalization Level      Work Restrictions      Type of Restriction: Work    May Return to Work: Specific Date    Return To Work Date: 3/7/2022    With / Without Restrictions: Without Restrictions          CODE STATUS:  Code Status and Medical Interventions:   Ordered at: 03/02/22 1912     Code Status (Patient has no pulse and is not breathing):    CPR (Attempt to Resuscitate)     Medical Interventions (Patient has pulse or is breathing):    Full Support         No future appointments.    Additional Instructions for the Follow-ups that You Need to Schedule     Discharge Follow-up with PCP   As directed       Currently Documented PCP:    Reinier Figueroa APRN    PCP Phone Number:    [phone]     Follow Up Details: Hospital discharge follow-up transitional care 1 to 2 weeks Chest pain myocardial infarction ruled out, prediabetes, hyperlipidemia, hypertension hypokalemia         Discharge Follow-up with Specified Provider: Dr. Mayberry Cardiology; 2 Weeks   As directed      To: Dr. Mayberry Cardiology    Follow Up: 2 Weeks    Follow Up Details: Hospital discharge follow-up chest pain MI ruled out               Pertinent  and/or Most Recent Results     PROCEDURES:   None    LAB RESULTS:      Lab  03/03/22  0537 03/02/22  1414   WBC 8.97 9.32   HEMOGLOBIN 14.3 14.5   HEMATOCRIT 39.8 39.1   PLATELETS 282 290   NEUTROS ABS  --  6.49   IMMATURE GRANS (ABS)  --  0.03   LYMPHS ABS  --  1.92   MONOS ABS  --  0.64   EOS ABS  --  0.18   MCV 87.7 86.7   PROTIME 10.9  --          Lab 03/03/22  0537 03/02/22  1414   SODIUM 142 137   POTASSIUM 3.9 3.1*   CHLORIDE 106 101   CO2 26.6 25.1   ANION GAP 9.4 10.9   BUN 18 16   CREATININE 1.04 1.08   EGFR 89.7 85.7   GLUCOSE 112* 99   CALCIUM 9.6 9.9   MAGNESIUM 2.4 2.1   PHOSPHORUS 2.6  --    HEMOGLOBIN A1C  --  5.80*         Lab 03/02/22  1414   TOTAL PROTEIN 7.3   ALBUMIN 4.70   GLOBULIN 2.6   ALT (SGPT) 34   AST (SGOT) 20   BILIRUBIN 0.4   ALK PHOS 91   LIPASE 17         Lab 03/03/22  0537 03/02/22  1414   PROBNP  --  <5.0   TROPONIN T <0.010  --    PROTIME 10.9  --    INR 1.05*  --          Lab 03/03/22  0537   CHOLESTEROL 211*   LDL CHOL 110*   HDL CHOL 33*   TRIGLYCERIDES 391*             Brief Urine Lab Results     None        Microbiology Results (last 10 days)     ** No results found for the last 240 hours. **          XR Chest 1 View    Result Date: 3/2/2022  Impression:   No radiographic findings of acute cardiopulmonary abnormality.       BETI ARZATE MD       Electronically Signed and Approved By: BETI ARZATE MD on 3/02/2022 at 14:36                       Results for orders placed during the hospital encounter of 03/02/22    Adult Transthoracic Echo Complete W/ Cont if Necessary Per Protocol    Interpretation Summary  · Left ventricular ejection fraction appears to be 51 - 55%. Left ventricular systolic function is low normal.  · Left ventricular diastolic function was normal.  · No evidence of significant valvular disease      Labs Pending at Discharge:  Pending Labs     Order Current Status    HOLD Troponin-I Tube Collected (03/02/22 1639)    POC Troponin I with Hold Tube Collected (03/02/22 1639)            Time spent on Discharge including face to face  service: Greater than 35 minutes    Electronically signed by Ramses Griffin MD, 03/03/22, 3:15 PM EST.

## 2022-03-03 NOTE — H&P
[facility]    Date of admission: 3/2/2022  Patient Name: Ramses Mcclelland   1975  Primary Care Physician:  Provider, No Known    Subjective     Chief Complaint: chest pain     HPI:  Ramses Mcclelland is a 46 y.o. male who presents with intermittent chest pain over the past 2 weeks.  Patient has a pmhx notable for PPD smoking and HTN.  Patient states he has had episodes of central chest pain that are unprovoked, last about 2-3 minutes, resolve without any particular intervention and are described as a tightness.  They dont seem worsened by activity and can happen at rest.  He notes they were fairly mild initially and happened once or twice a day intermittently. Symptoms have gradually worsened over that time.  He has not tried any new medications for this.  Denies gerd symptoms.  Today at work he had an episode that was sever, central chest heaviness/weight/pressure like, he became flushed, had arm and hand numbness and tingling.  He states the episode made him the most terrified he has ever been and this prompted him to seek ED evaluation.  He was given 2x NG by EMS and notes this was the first thing that started to relieve his symptoms.  Currently he has only mild residual symptoms.      He notes taking wellbutrin and mirtazapine for anxiety/depression but states these symptoms are not like his anxiety and dont feel like a panic attack.    Pt states he stopped his ppd smoking after his first CP episode 2 weeks ago.     Review of Systems  All systems were reviewed and negative except as noted in HPI    Personal History     Past Medical History:  HTN  Chronic nicotine use (PPD smoking quit 2 weeks ago)  Depression/anxiety  Seasonal allergies    Past Surgical History:  Denies any prior     Family History:   No mi or chf hx, dad  from liver problems    Social History:   PPD smoking of ~20+ years   Rare etoh use, prior heavier drinking but denies alcoholism  No illicits  Works  at a distillery in a warehouse     Home Medications: hctz, wellbutrin, mirtazapine, mvi, claritin       Allergies:  No Known Allergies    Objective     Vitals:   Temp:  [98.8 °F (37.1 °C)] 98.8 °F (37.1 °C)  Heart Rate:  [80-96] 80  Resp:  [18] 18  BP: (123-172)/() 142/93    Physical Exam    Constitutional: Awake, alert, no acute distress   Eyes: Pupils equal, no scleral icterus   HENT: NCAT, mucous membranes moist   Neck: Supple, no thyromegaly   Respiratory: Clear to auscultation bilaterally, nonlabored respirations    Cardiovascular: RRR, no murmurs, rubs, or gallops   Gastrointestinal: abdomen is soft, nontender, nondistended, +BS   Musculoskeletal: strength symmetric/equal, no cyanosis to extremities   Psychiatric: Appropriate affect, cooperative, no si/hi   Neurologic: Oriented x 3, moves all extremities spontaneously, speech clear   Skin: No rashes or lesions noted, warm/dry     Result Review    Vital signs, labs and any relevant imaging reviewed.     Initial EKG: nsr no acute st depressions noted    Repeat EKG: RBB and st depressions in lateral leads     Assessment / Plan     Unstable Angina  Hypokalemia  HTN  Anxiety/depression  Chronic nicotine use (PPD smoking quit 2 weeks ago)  Depression/anxiety    -admit to monitored bed  -check 2d echo  -prn NG for further CP episodes  -trend troponin, negative thus far  -aspirin load given, continue aspirin 81mg  -start atorvastatin, check lipid panel  -hold hctz (replace potassium which may be low secondary to hctz) and start on metoprolol instead  -cardiology/dr naylor consulted by ED and requesting medicine admit and will evaluate further in am   -further AC plans as per cardiology  -cont home anxiety/depression medications once dosing confirmed  -NRT prn, encouraged continued cessation  -f/u am labs  -rest per orders    Diet: cardiac  DVT Prophylaxis: lovenox  Code: full

## 2022-03-03 NOTE — CONSULTS
[facility]   Cardiology Consult Note    Patient Name: Ramses Mcclelland  : 1975  MRN: [id number]  Primary Care Physician:  Provider, No Known  Referring Physician: No ref. provider found  Date of admission: 3/2/2022    Subjective   Subjective     Reason for Consult/ Chief Complaint: Chest pain    HPI:  Ramses Mcclelland is a 46 y.o. male with history of chest Pain on and off for the last 2 weeks.  Chest pain is substernal, sharp to aching, associate with some tingling and numbness.  No exertional angina.  No aggravating relieving factors.  Relieved spontaneously.  Appears comfortable now.  No associated shortness of breath.  No syncopal or presyncopal spell.    Review of Systems:   Constitutional no fever,  no weight loss   Skin no rash   Otolaryngeal no difficulty swallowing   Cardiovascular See HPI   Pulmonary no cough, no sputum production   Gastrointestinal no constipation, no diarrhea   Genitourinary no dysuria, no hematuria   Hematologic no easy bruisability, no abnormal bleeding   Musculoskeletal no muscle pain   Neurologic no dizziness, no falls         Personal History       Past Medical/Surgical History: History reviewed. No pertinent past medical history.  History reviewed. No pertinent surgical history.      Family History: No Family History of CAD.    Social History:  reports that he quit smoking about 2 weeks ago. He does not have any smokeless tobacco history on file.    Medications:  Medications Prior to Admission   Medication Sig Dispense Refill Last Dose   • buPROPion XL (WELLBUTRIN XL) 150 MG 24 hr tablet Take 150 mg by mouth Every Night.   3/1/2022 at Unknown time   • hydroCHLOROthiazide (HYDRODIURIL) 25 MG tablet Take 25 mg by mouth Every Night.   3/1/2022 at Unknown time   • mirtazapine (REMERON) 15 MG tablet Take 15 mg by mouth Every Night.   3/1/2022 at Unknown time   • multivitamin with minerals tablet tablet Take 1 tablet by mouth Daily.   3/1/2022 at Unknown time     Current  medications:  aspirin, 81 mg, Oral, Daily  atorvastatin, 80 mg, Oral, Nightly  buPROPion XL, 150 mg, Oral, Daily  enoxaparin, 40 mg, Subcutaneous, Q24H  metoprolol tartrate, 12.5 mg, Oral, Q12H  mirtazapine, 15 mg, Oral, Nightly  multivitamin with minerals, 1 tablet, Oral, Daily  senna-docusate sodium, 2 tablet, Oral, BID      Current IV drips:       Allergies:  No Known Allergies    Objective    Objective     Vitals:   Temp:  [97.3 °F (36.3 °C)-98.8 °F (37.1 °C)] 97.3 °F (36.3 °C)  Heart Rate:  [63-96] 63  Resp:  [17-27] 20  BP: (106-172)/() 106/70      Physical Exam:   Constitutional: Awake, alert, No acute distress    Eyes: PERRLA, sclerae anicteric, no conjunctival injection   HENT: NCAT, mucous membranes moist   Neck: Supple, no thyromegaly, no lymphadenopathy, trachea midline   Respiratory: Clear to auscultation bilaterally, nonlabored respirations    Cardiovascular: RRR, no murmurs, rubs, or gallops, palpable pedal pulses bilaterally   Gastrointestinal: Positive bowel sounds, soft, nontender, nondistended   Musculoskeletal: No bilateral ankle edema, no clubbing or cyanosis to extremities   Psychiatric: Appropriate affect, cooperative   Neurologic: Oriented x 3, strength symmetric in all extremities, Cranial Nerves grossly intact to confrontation, speech clear   Skin: No rashes.    Result Review    Result Review:  I have personally reviewed the results from the time of this admission to 3/3/2022 08:52 EST and agree with these findings:  [x]  Laboratory  [x]  EKG/Telemetry   [x]  Cardiology/Vascular   []  Pathology  [x]  Old records  [x]  Medications  Basic Metabolic Panel    Sodium Sodium   Date Value Ref Range Status   03/03/2022 142 136 - 145 mmol/L Final   03/02/2022 137 136 - 145 mmol/L Final      Potassium Potassium   Date Value Ref Range Status   03/03/2022 3.9 3.5 - 5.2 mmol/L Final   03/02/2022 3.1 (L) 3.5 - 5.2 mmol/L Final      Chloride Chloride   Date Value Ref Range Status   03/03/2022 106 98  - 107 mmol/L Final   03/02/2022 101 98 - 107 mmol/L Final      Bicarbonate No results found for: PLASMABICARB   BUN BUN   Date Value Ref Range Status   03/03/2022 18 6 - 20 mg/dL Final   03/02/2022 16 6 - 20 mg/dL Final      Creatinine Creatinine   Date Value Ref Range Status   03/03/2022 1.04 0.76 - 1.27 mg/dL Final   03/02/2022 1.08 0.76 - 1.27 mg/dL Final       Calcium   Date Value Ref Range Status   03/03/2022 9.6 8.6 - 10.5 mg/dL Final   03/02/2022 9.9 8.6 - 10.5 mg/dL Final               Lab Results   Component Value Date    PROBNP <5.0 03/02/2022          EKG shows sinus rhythm with no acute changes.  Telemetry reviewed shows sinus rhythm.    Assessment / Plan     Impression:   1.  Precordial atypical chest pain negative troponins.  2.  Hyperlipidemia.    Plan:   1.  Sestamibi stress test to evaluate for any significant ischemia.  2.  Echocardiogram.  3.  Continue metoprolol and Lipitor.    Electronically signed by Ismael Mayberry MD, 03/03/22, 8:52 AM EST.

## 2022-03-03 NOTE — DISCHARGE INSTR - APPOINTMENTS
Follow-up appointment with Reinier Figueroa 3/10/22 2:30pm    [facility] a [facility] for Dr[facility] Mayberry

## 2022-03-03 NOTE — CASE MANAGEMENT/SOCIAL WORK
Discharge Planning Assessment   [facility]     Patient Name: Ramses Mcclelland  MRN: [id number]  Today's Date: 3/3/2022    Admit Date: 3/2/2022     Discharge Needs Assessment     Row Name 03/03/22 1549       Living Environment    Lives With spouse; child(doni), dependent    Current Living Arrangements home/apartment/condo    Primary Care Provided by self    Family Caregiver if Needed spouse    Quality of Family Relationships helpful; involved; supportive       Resource/Environmental Concerns    Resource/Environmental Concerns none       Transition Planning    Patient/Family Anticipates Transition to home    Patient/Family Anticipated Services at Transition none       Discharge Needs Assessment    Readmission Within the Last 30 Days no previous admission in last 30 days    Equipment Currently Used at Home none    Concerns to be Addressed denies needs/concerns at this time    Equipment Needed After Discharge none               Discharge Plan     Row Name 03/03/22 1550       Plan    Final Discharge Disposition Code 01 - home or self-care    Final Note Pt discharging home with self care. Pt denies any dc needs. Dc medications sent to pharmacy. Verified demographics and pharmacy.              Continued Care and Services - Admitted Since 3/2/2022    Coordination has not been started for this encounter.       Expected Discharge Date and Time     Expected Discharge Date Expected Discharge Time    Mar 3, 2022          Demographic Summary     Row Name 03/03/22 1548       General Information    Admission Type observation    Arrived From emergency department    Reason for Consult discharge planning    Preferred Language English     Used During This Interaction no       Contact Information    Permission Granted to Share Info With family/designee               Functional Status     Row Name 03/03/22 1549       Functional Status    Usual Activity Tolerance excellent    Current Activity Tolerance excellent       Functional  Status, IADL    Medications independent    Meal Preparation independent    Housekeeping independent    Laundry independent    Shopping independent       Mental Status    General Appearance WDL WDL       Mental Status Summary    Recent Changes in Mental Status/Cognitive Functioning no changes       Employment/    Employment Status employed full-time    Shift Worked first shift               Psychosocial    No documentation.                Abuse/Neglect    No documentation.                Legal    No documentation.                Substance Abuse    No documentation.                Patient Forms    No documentation.                   Eugenia Rosario RN

## 2022-03-04 ENCOUNTER — READMISSION MANAGEMENT (OUTPATIENT)
Dept: CALL CENTER | Facility: HOSPITAL | Age: 47
End: 2022-03-04

## 2022-03-04 NOTE — OUTREACH NOTE
Prep Survey      Responses   [facility] patient discharged from? [facility]   Is LACE score < 7 ? Yes   Emergency Room discharge w/ pulse ox? No   Eligibility Not Eligible   What are the reasons patient is not eligible? Other   Does the patient have one of the following disease processes/diagnoses(primary or secondary)? Other   Prep survey completed? Yes          Julissa Webster RN

## 2022-03-04 NOTE — SIGNIFICANT NOTE
03/03/22 1145   Coping/Psychosocial   Observed Emotional State calm; happy   Verbalized Emotional State hopefulness; acceptance   Trust Relationship/Rapport empathic listening provided   Involvement in Care interacting with patient   Additional Documentation Spiritual Care (Group)   Spiritual Care   Use of Spiritual Resources non-Judaism use of spiritual care   Spiritual Care Source  initiative   Spiritual Care Follow-Up follow-up, none required as presently assessed   Response to Spiritual Care receptive of support; engaged in conversation   Spiritual Care Interventions supportive conversation provided   Spiritual Care Visit Type initial   Receptivity to Spiritual Care visit welcomed

## 2022-03-23 LAB
QT INTERVAL: 351 MS
QT INTERVAL: 392 MS